# Patient Record
Sex: FEMALE | Race: ASIAN | NOT HISPANIC OR LATINO | URBAN - METROPOLITAN AREA
[De-identification: names, ages, dates, MRNs, and addresses within clinical notes are randomized per-mention and may not be internally consistent; named-entity substitution may affect disease eponyms.]

---

## 2020-11-16 ENCOUNTER — INPATIENT (INPATIENT)
Facility: HOSPITAL | Age: 50
LOS: 1 days | Discharge: HOME | DRG: 394 | End: 2020-11-18
Attending: STUDENT IN AN ORGANIZED HEALTH CARE EDUCATION/TRAINING PROGRAM | Admitting: STUDENT IN AN ORGANIZED HEALTH CARE EDUCATION/TRAINING PROGRAM
Payer: COMMERCIAL

## 2020-11-16 VITALS
SYSTOLIC BLOOD PRESSURE: 117 MMHG | WEIGHT: 145.06 LBS | RESPIRATION RATE: 22 BRPM | DIASTOLIC BLOOD PRESSURE: 77 MMHG | TEMPERATURE: 98 F | OXYGEN SATURATION: 100 % | HEART RATE: 100 BPM | HEIGHT: 62 IN

## 2020-11-16 LAB
ALBUMIN SERPL ELPH-MCNC: 4 G/DL — SIGNIFICANT CHANGE UP (ref 3.3–5)
ALP SERPL-CCNC: 62 U/L — SIGNIFICANT CHANGE UP (ref 40–120)
ALT FLD-CCNC: 13 U/L — SIGNIFICANT CHANGE UP (ref 10–45)
ANION GAP SERPL CALC-SCNC: 11 MMOL/L — SIGNIFICANT CHANGE UP (ref 5–17)
ANISOCYTOSIS BLD QL: SIGNIFICANT CHANGE UP
APPEARANCE UR: CLEAR — SIGNIFICANT CHANGE UP
APTT BLD: 30.6 SEC — SIGNIFICANT CHANGE UP (ref 27.5–35.5)
AST SERPL-CCNC: 17 U/L — SIGNIFICANT CHANGE UP (ref 10–40)
BACTERIA # UR AUTO: ABNORMAL
BASOPHILS # BLD AUTO: 0.09 K/UL — SIGNIFICANT CHANGE UP (ref 0–0.2)
BASOPHILS NFR BLD AUTO: 0.9 % — SIGNIFICANT CHANGE UP (ref 0–2)
BILIRUB SERPL-MCNC: 0.2 MG/DL — SIGNIFICANT CHANGE UP (ref 0.2–1.2)
BILIRUB UR-MCNC: NEGATIVE — SIGNIFICANT CHANGE UP
BLD GP AB SCN SERPL QL: POSITIVE — SIGNIFICANT CHANGE UP
BUN SERPL-MCNC: 12 MG/DL — SIGNIFICANT CHANGE UP (ref 7–23)
CALCIUM SERPL-MCNC: 8.1 MG/DL — LOW (ref 8.4–10.5)
CHLORIDE SERPL-SCNC: 107 MMOL/L — SIGNIFICANT CHANGE UP (ref 96–108)
CO2 SERPL-SCNC: 22 MMOL/L — SIGNIFICANT CHANGE UP (ref 22–31)
COLOR SPEC: SIGNIFICANT CHANGE UP
CREAT SERPL-MCNC: 0.71 MG/DL — SIGNIFICANT CHANGE UP (ref 0.5–1.3)
DIFF PNL FLD: NEGATIVE — SIGNIFICANT CHANGE UP
EOSINOPHIL # BLD AUTO: 0.09 K/UL — SIGNIFICANT CHANGE UP (ref 0–0.5)
EOSINOPHIL NFR BLD AUTO: 0.9 % — SIGNIFICANT CHANGE UP (ref 0–6)
EPI CELLS # UR: 4 /HPF — SIGNIFICANT CHANGE UP
GLUCOSE SERPL-MCNC: 113 MG/DL — HIGH (ref 70–99)
GLUCOSE UR QL: NEGATIVE — SIGNIFICANT CHANGE UP
HCG UR QL: NEGATIVE — SIGNIFICANT CHANGE UP
HCT VFR BLD CALC: 23.9 % — LOW (ref 34.5–45)
HGB BLD-MCNC: 6.5 G/DL — CRITICAL LOW (ref 11.5–15.5)
HYALINE CASTS # UR AUTO: 1 /LPF — SIGNIFICANT CHANGE UP (ref 0–2)
HYPOCHROMIA BLD QL: SLIGHT — SIGNIFICANT CHANGE UP
INR BLD: 0.97 RATIO — SIGNIFICANT CHANGE UP (ref 0.88–1.16)
KETONES UR-MCNC: NEGATIVE — SIGNIFICANT CHANGE UP
LEUKOCYTE ESTERASE UR-ACNC: ABNORMAL
LYMPHOCYTES # BLD AUTO: 1.99 K/UL — SIGNIFICANT CHANGE UP (ref 1–3.3)
LYMPHOCYTES # BLD AUTO: 19.1 % — SIGNIFICANT CHANGE UP (ref 13–44)
MACROCYTES BLD QL: SLIGHT — SIGNIFICANT CHANGE UP
MANUAL SMEAR VERIFICATION: SIGNIFICANT CHANGE UP
MCHC RBC-ENTMCNC: 25 PG — LOW (ref 27–34)
MCHC RBC-ENTMCNC: 27.2 GM/DL — LOW (ref 32–36)
MCV RBC AUTO: 91.9 FL — SIGNIFICANT CHANGE UP (ref 80–100)
MONOCYTES # BLD AUTO: 0.27 K/UL — SIGNIFICANT CHANGE UP (ref 0–0.9)
MONOCYTES NFR BLD AUTO: 2.6 % — SIGNIFICANT CHANGE UP (ref 2–14)
NEUTROPHILS # BLD AUTO: 7.96 K/UL — HIGH (ref 1.8–7.4)
NEUTROPHILS NFR BLD AUTO: 76.5 % — SIGNIFICANT CHANGE UP (ref 43–77)
NITRITE UR-MCNC: POSITIVE
NRBC # BLD: 1 /100 — HIGH (ref 0–0)
OB PNL STL: NEGATIVE — SIGNIFICANT CHANGE UP
PH UR: 5.5 — SIGNIFICANT CHANGE UP (ref 5–8)
PLAT MORPH BLD: NORMAL — SIGNIFICANT CHANGE UP
PLATELET # BLD AUTO: 448 K/UL — HIGH (ref 150–400)
POLYCHROMASIA BLD QL SMEAR: SLIGHT — SIGNIFICANT CHANGE UP
POTASSIUM SERPL-MCNC: 4 MMOL/L — SIGNIFICANT CHANGE UP (ref 3.5–5.3)
POTASSIUM SERPL-SCNC: 4 MMOL/L — SIGNIFICANT CHANGE UP (ref 3.5–5.3)
PROT SERPL-MCNC: 6.6 G/DL — SIGNIFICANT CHANGE UP (ref 6–8.3)
PROT UR-MCNC: NEGATIVE — SIGNIFICANT CHANGE UP
PROTHROM AB SERPL-ACNC: 11.6 SEC — SIGNIFICANT CHANGE UP (ref 10.6–13.6)
RBC # BLD: 2.6 M/UL — LOW (ref 3.8–5.2)
RBC # FLD: SIGNIFICANT CHANGE UP (ref 10.3–14.5)
RBC BLD AUTO: ABNORMAL
RBC CASTS # UR COMP ASSIST: 4 /HPF — SIGNIFICANT CHANGE UP (ref 0–4)
RH IG SCN BLD-IMP: POSITIVE — SIGNIFICANT CHANGE UP
SCHISTOCYTES BLD QL AUTO: SLIGHT — SIGNIFICANT CHANGE UP
SODIUM SERPL-SCNC: 140 MMOL/L — SIGNIFICANT CHANGE UP (ref 135–145)
SP GR SPEC: 1.02 — SIGNIFICANT CHANGE UP (ref 1.01–1.02)
UROBILINOGEN FLD QL: NEGATIVE — SIGNIFICANT CHANGE UP
WBC # BLD: 10.41 K/UL — SIGNIFICANT CHANGE UP (ref 3.8–10.5)
WBC # FLD AUTO: 10.41 K/UL — SIGNIFICANT CHANGE UP (ref 3.8–10.5)
WBC UR QL: 9 /HPF — HIGH (ref 0–5)

## 2020-11-16 PROCEDURE — 99285 EMERGENCY DEPT VISIT HI MDM: CPT

## 2020-11-16 NOTE — ED PROVIDER NOTE - PROGRESS NOTE DETAILS
Attending Masgiles:  Evaluated pt earlier. report of gi bleed. lighthead sob. Plan to fu CT read to r/o active bleeding. Pt had pos urine on sign out so ceftriaxone/urine cx added. CT resulted w/ no acute pathology. Discussed w/ hospitalist, admitted to [facility].

## 2020-11-16 NOTE — ED ADULT NURSE REASSESSMENT NOTE - NS ED NURSE REASSESS COMMENT FT1
PRBC transfusion started over 3 hours. Pt educated on risks and benefits of transfusion, and pt educated on s/s of transfusion reaction to be aware of. 2 peripheral IV in place. On bedside cardiac monitor. Consent in chart. 2 RN at bedside to confirm right pt and right blood product. Bed locked and lowered. Comfort and safety measures maintained.

## 2020-11-16 NOTE — ED ADULT NURSE NOTE - OBJECTIVE STATEMENT
50 year old female presents to ED via walk-in complaining of abnormal lab result, low hemoglobin. PMH thyroid cancer, had thyroid removed. Pt states she went to family doctor because she was having bloody bowel movements due to hemorriods, and was told to come to the emergency room. Upon assessment A&O x4, ambulatory with steady gait and well appearing. Denies dizziness, lightheadedness, however states she sometimes feel like she might faint. Pt also endorses palpitations, denies chest pain, and denies SOB. States she is having bright red blood stool, on and off since Thursday x5 days ago. Pt seen by Qdoc and Qdoc RN. Blood work and IV placed by Qdoc RN, and EKG completed in triage. On bedside cardiac monitor, VS as documented. Bed locked and lowered. Comfort and safety measures maintained.

## 2020-11-16 NOTE — ED PROVIDER NOTE - OBJECTIVE STATEMENT
49 y/o female hx of hemorrhoids (internal and external) presents for low outpatient hgb. states 11/7 days ago she had large volume rectal bleeding, had 4 similar episodes over the next 2 days.  states it has decreased since the 7th but that has had 3 more episodes within the last 7 days. the patient endorses SOB on exertion, cold sweats and palpitations. patient has never required transfusion.  pmd dr. jessica davis  gi: Dr. nidhi Alcaraz ([place])

## 2020-11-16 NOTE — ED PROVIDER NOTE - RAPID ASSESSMENT
50 y.o F presents with low H&H sent by PMD. Pt went to see PMD on Saturday and was found to have low H&H. PT has been endorsing SOB, palpitations, and rectal bleeding. Denies being on blood thinners.    Sony Barros (MD) note: The scribe (Cara Bradley)'s documentation has been prepared under my direction and personally reviewed by me.  Patient was seen as a tele QDOC patient, a thorough physical exam was not performed. The patient will be seen and further worked up in the main emergency department and their care will be completed by the main emergency department team. Receiving team will follow up on labs, analgesia, any clinical imaging, reassess and disposition as clinically indicated, all decisions regarding the progression of care will be made at their discretion.

## 2020-11-16 NOTE — ED ADULT NURSE REASSESSMENT NOTE - NS ED NURSE REASSESS COMMENT FT1
Lab called triage RN for critical lab value of h/h 6.5. MD Barros made aware. Per MD, pt should be brought back to be seen on a side. Mobile RN made aware. Pt pending placement. Remains a/ox4, VSS, sensory/motor function intact and in NAD at this time.

## 2020-11-16 NOTE — ED ADULT NURSE REASSESSMENT NOTE - NS ED NURSE REASSESS COMMENT FT1
Pt ambulated to bathroom with a steady gait. Pt ambulated to bathroom with a steady gait. Pt returned to bed safely. Pt denies dizziness and lightheadedness with ambulation at this time. Per blood bank, antibody testing to result in order to release blood. Bed locked and lowered. Comfort and safety measures maintained.

## 2020-11-16 NOTE — ED ADULT TRIAGE NOTE - CHIEF COMPLAINT QUOTE
sent for low h/h of 7.1 and rectal bleeding beginning Thursday, endorses SOB and heart palpitations. Patient denies  being on blood thinners.

## 2020-11-16 NOTE — ED PROVIDER NOTE - RAPID ASSESSMENT DATE/TIME
[facility]   Statement to Patients  April 1, 2014      In an effort to ensure the large volume of patient prescription refills is processed in the most efficient and expeditious manner, we are asking our patients to assist us by calling your Pharmacy for all prescription refills, this will include also your  Mail Order Pharmacy. The pharmacy will contact our office electronically to continue the refill process. Please do not wait until the last minute to call your pharmacy. We need at least 48 hours (2days) to fill prescriptions. We also encourage you to call your pharmacy before going to  your prescription to make sure it is ready. With regard to controlled substance prescription refill requests (narcotic refills) that need to be picked up at our office, we ask your cooperation by providing us with at least 72 hours (3days) notice that you will need a refill. We will not refill narcotic prescription refill requests after 4:00pm on any weekday, Monday through Thursday, or after 2:00pm on Fridays, or on the weekends. We encourage everyone to explore another way of getting your prescription refill request processed using [facility], our patient web portal through our electronic medical record system. [facility] is an efficient and effective way to communicate your medication request directly to the office and  downloadable as an jamie on your smart phone . [facility] also features a review functionality that allows you to view your medication list as well as leave messages for your physician. Are you ready to get connected? If so please review the attatched instructions or speak to any of our staff to get you set up right away! Thank you so much for your cooperation. Should you have any questions please contact our Practice Administrator.     The Physicians and Staff,  [facility]
16-Nov-2020 20:04

## 2020-11-17 DIAGNOSIS — Z98.890 OTHER SPECIFIED POSTPROCEDURAL STATES: Chronic | ICD-10-CM

## 2020-11-17 DIAGNOSIS — E03.9 HYPOTHYROIDISM, UNSPECIFIED: ICD-10-CM

## 2020-11-17 DIAGNOSIS — K76.9 LIVER DISEASE, UNSPECIFIED: ICD-10-CM

## 2020-11-17 DIAGNOSIS — D64.9 ANEMIA, UNSPECIFIED: ICD-10-CM

## 2020-11-17 DIAGNOSIS — N39.0 URINARY TRACT INFECTION, SITE NOT SPECIFIED: ICD-10-CM

## 2020-11-17 DIAGNOSIS — K64.9 UNSPECIFIED HEMORRHOIDS: ICD-10-CM

## 2020-11-17 LAB
HCT VFR BLD CALC: 30.1 % — LOW (ref 34.5–45)
HCT VFR BLD CALC: 30.4 % — LOW (ref 34.5–45)
HGB BLD-MCNC: 9 G/DL — LOW (ref 11.5–15.5)
HGB BLD-MCNC: 9 G/DL — LOW (ref 11.5–15.5)
MCHC RBC-ENTMCNC: 25.9 PG — LOW (ref 27–34)
MCHC RBC-ENTMCNC: 26.4 PG — LOW (ref 27–34)
MCHC RBC-ENTMCNC: 29.6 GM/DL — LOW (ref 32–36)
MCHC RBC-ENTMCNC: 29.9 GM/DL — LOW (ref 32–36)
MCV RBC AUTO: 87.4 FL — SIGNIFICANT CHANGE UP (ref 80–100)
MCV RBC AUTO: 88.3 FL — SIGNIFICANT CHANGE UP (ref 80–100)
NRBC # BLD: 0 /100 WBCS — SIGNIFICANT CHANGE UP (ref 0–0)
NRBC # BLD: 0 /100 WBCS — SIGNIFICANT CHANGE UP (ref 0–0)
PLATELET # BLD AUTO: 365 K/UL — SIGNIFICANT CHANGE UP (ref 150–400)
PLATELET # BLD AUTO: 375 K/UL — SIGNIFICANT CHANGE UP (ref 150–400)
RBC # BLD: 3.41 M/UL — LOW (ref 3.8–5.2)
RBC # BLD: 3.48 M/UL — LOW (ref 3.8–5.2)
RBC # FLD: 22.9 % — HIGH (ref 10.3–14.5)
RBC # FLD: 23.3 % — HIGH (ref 10.3–14.5)
SARS-COV-2 RNA SPEC QL NAA+PROBE: SIGNIFICANT CHANGE UP
WBC # BLD: 7.35 K/UL — SIGNIFICANT CHANGE UP (ref 3.8–10.5)
WBC # BLD: 9.31 K/UL — SIGNIFICANT CHANGE UP (ref 3.8–10.5)
WBC # FLD AUTO: 7.35 K/UL — SIGNIFICANT CHANGE UP (ref 3.8–10.5)
WBC # FLD AUTO: 9.31 K/UL — SIGNIFICANT CHANGE UP (ref 3.8–10.5)

## 2020-11-17 PROCEDURE — 12345: CPT | Mod: NC

## 2020-11-17 PROCEDURE — 74183 MRI ABD W/O CNTR FLWD CNTR: CPT | Mod: 26

## 2020-11-17 PROCEDURE — 74177 CT ABD & PELVIS W/CONTRAST: CPT | Mod: 26

## 2020-11-17 PROCEDURE — 99223 1ST HOSP IP/OBS HIGH 75: CPT

## 2020-11-17 RX ORDER — LEVOTHYROXINE SODIUM 125 MCG
1 TABLET ORAL
Qty: 0 | Refills: 0 | DISCHARGE

## 2020-11-17 RX ORDER — ACETAMINOPHEN 500 MG
650 TABLET ORAL EVERY 4 HOURS
Refills: 0 | Status: DISCONTINUED | OUTPATIENT
Start: 2020-11-17 | End: 2020-11-18

## 2020-11-17 RX ORDER — LEVOTHYROXINE SODIUM 125 MCG
100 TABLET ORAL DAILY
Refills: 0 | Status: DISCONTINUED | OUTPATIENT
Start: 2020-11-17 | End: 2020-11-18

## 2020-11-17 RX ORDER — SOD SULF/SODIUM/NAHCO3/KCL/PEG
4000 SOLUTION, RECONSTITUTED, ORAL ORAL ONCE
Refills: 0 | Status: COMPLETED | OUTPATIENT
Start: 2020-11-17 | End: 2020-11-17

## 2020-11-17 RX ORDER — CEFTRIAXONE 500 MG/1
1000 INJECTION, POWDER, FOR SOLUTION INTRAMUSCULAR; INTRAVENOUS ONCE
Refills: 0 | Status: COMPLETED | OUTPATIENT
Start: 2020-11-17 | End: 2020-11-17

## 2020-11-17 RX ORDER — SOD SULF/SODIUM/NAHCO3/KCL/PEG
4000 SOLUTION, RECONSTITUTED, ORAL ORAL ONCE
Refills: 0 | Status: DISCONTINUED | OUTPATIENT
Start: 2020-11-17 | End: 2020-11-18

## 2020-11-17 RX ORDER — PHENYLEPHRINE-SHARK LIVER OIL-MINERAL OIL-PETROLATUM RECTAL OINTMENT
1 OINTMENT (GRAM) RECTAL THREE TIMES A DAY
Refills: 0 | Status: DISCONTINUED | OUTPATIENT
Start: 2020-11-17 | End: 2020-11-18

## 2020-11-17 RX ADMIN — PHENYLEPHRINE-SHARK LIVER OIL-MINERAL OIL-PETROLATUM RECTAL OINTMENT 1 APPLICATION(S): at 22:39

## 2020-11-17 RX ADMIN — Medication 5 MILLIGRAM(S): at 21:28

## 2020-11-17 RX ADMIN — CEFTRIAXONE 100 MILLIGRAM(S): 500 INJECTION, POWDER, FOR SOLUTION INTRAMUSCULAR; INTRAVENOUS at 01:47

## 2020-11-17 RX ADMIN — Medication 100 MICROGRAM(S): at 05:51

## 2020-11-17 RX ADMIN — Medication 4000 MILLILITER(S): at 17:46

## 2020-11-17 NOTE — CONSULT NOTE ADULT - SUBJECTIVE AND OBJECTIVE BOX
Chief Complaint:  Patient is a 50y old  Female who presents with a chief complaint of anemia (2020 05:17)      HPI: 51 y/o female w/pmhx of thyroid cancer s/p thyroidectomy ,  hemorrhoids (internal and external) s/p banding , presents for worsened anemia. Patient reports several days of bright red blood per rectum. She reports bleeding subsides and then recurs after bowel movements. She was evaluated by her PMD on , and patient was notified on day of admission, that labs from the visit showed a hgb of 7.1 and patient was advised to come to the hospital. Patient reports some shortness of breath and fatigue with exertion , as well as intermittent palpitations ,she has no lightheadedness , no chest pain. She reports no abdominal pain , no nausea or vomiting. She has not yet had a routine colonoscopy. Patient does not use NSAIDs or asa.     Allergies:  No Known Allergies      Home Medications:    Hospital Medications:  acetaminophen   Tablet .. 650 milliGRAM(s) Oral every 4 hours PRN  levothyroxine 100 MICROGram(s) Oral daily      PMHX/PSHX:  Thyroid cancer    Hemorrhoids    S/P thyroidectomy        Family history:  FH: lung cancer        Denies family history of colon cancer/polyps, stomach cancer/polyps, pancreatic cancer/masses, liver cancer/disease, ovarian cancer and endometrial cancer.    Social History:     Tob: Denies  EtOH: Denies  Illicit Drugs: Denies    ROS:     General:  No wt loss, fevers, chills, night sweats, fatigue  Eyes:  Good vision, no reported pain  ENT:  No sore throat, pain, runny nose, dysphagia  CV:  No pain, palpitations, hypo/hypertension  Pulm:  No dyspnea, cough, tachypnea, wheezing  GI:  No pain, No nausea, No vomiting, No diarrhea, No constipation, No weight loss, No fever, No pruritis, No rectal bleeding, No tarry stools, No dysphagia,  :  No pain, bleeding, incontinence, nocturia  Muscle:  No pain, weakness  Neuro:  No weakness, tingling, memory problems  Psych:  No fatigue, insomnia, mood problems, depression  Endocrine:  No polyuria, polydipsia, cold/heat intolerance  Heme:  No petechiae, ecchymosis, easy bruisability  Skin:  No rash, tattoos, scars, edema    PHYSICAL EXAM:     GENERAL:  No acute distress  HEENT:  Normocephalic/atraumatic, no scleral icterus  CHEST:  Clear to auscultation bilaterally, no wheezes/rales/ronchi, no accessory muscle use  HEART:  Regular rate and rhythm, no murmurs/rubs/gallops  ABDOMEN:  Soft, non-tender, non-distended, normoactive bowel sounds,  no masses, no hepato-splenomegaly, no signs of chronic liver disease  EXTREMITIES: No cyanosis, clubbing, or edema  SKIN:  No rash/erythema/ecchymoses/petechiae/wounds/abscess/warm/dry  NEURO:  Alert and oriented x 3, no asterixis    Vital Signs:  Vital Signs Last 24 Hrs  T(C): 37 (2020 08:03), Max: 37 (2020 08:03)  T(F): 98.6 (2020 08:03), Max: 98.6 (2020 08:03)  HR: 85 (2020 08:03) (80 - 100)  BP: 107/73 (2020 08:03) (101/70 - 134/67)  BP(mean): --  RR: 19 (2020 08:03) (17 - 22)  SpO2: 99% (2020 08:03) (98% - 100%)  Daily Height in cm: 157.48 (2020 19:59)    Daily     LABS:                        9.0    9.31  )-----------( 375      ( 2020 06:43 )             30.1     Mean Cell Volume: 88.3 fl (-20 @ 06:43)        140  |  107  |  12  ----------------------------<  113<H>  4.0   |  22  |  0.71    Ca    8.1<L>      2020 20:32    TPro  6.6  /  Alb  4.0  /  TBili  0.2  /  DBili  x   /  AST  17  /  ALT  13  /  AlkPhos  62  11-16    LIVER FUNCTIONS - ( 2020 20:32 )  Alb: 4.0 g/dL / Pro: 6.6 g/dL / ALK PHOS: 62 U/L / ALT: 13 U/L / AST: 17 U/L / GGT: x           PT/INR - ( 2020 20:32 )   PT: 11.6 sec;   INR: 0.97 ratio         PTT - ( 2020 20:32 )  PTT:30.6 sec  Urinalysis Basic - ( 2020 22:38 )    Color: Light Yellow / Appearance: Clear / S.017 / pH: x  Gluc: x / Ketone: Negative  / Bili: Negative / Urobili: Negative   Blood: x / Protein: Negative / Nitrite: Positive   Leuk Esterase: Moderate / RBC: 4 /hpf / WBC 9 /HPF   Sq Epi: x / Non Sq Epi: 4 /hpf / Bacteria: Many                              9.0    9.31  )-----------( 375      ( 2020 06:43 )             30.1                         6.5    10.41 )-----------( 448      ( 2020 20:32 )             23.9       Imaging:           Chief Complaint:  Patient is a 50y old  Female who presents with a chief complaint of anemia (2020 05:17)      HPI: 50 F w/ Hx of hemorrhoids s/p banding (>5 years ago), thyroid cancer s/p thyroidectomy presenting from family medicine doctor for anemia w/ Hb 7.1 on outpatient labs and BRBPR since .     Endorses she     Patient reports several days of bright red blood per rectum. She reports bleeding subsides and then recurs after bowel movements. She was evaluated by her PMD on , and patient was notified on day of admission, that labs from the visit showed a hgb of 7.1 and patient was advised to come to the hospital. Patient reports some shortness of breath and fatigue with exertion , as well as intermittent palpitations ,she has no lightheadedness , no chest pain. She reports no abdominal pain , no nausea or vomiting. She has not yet had a routine colonoscopy. Patient does not use NSAIDs or asa.     Allergies:  No Known Allergies      Home Medications:    Hospital Medications:  acetaminophen   Tablet .. 650 milliGRAM(s) Oral every 4 hours PRN  levothyroxine 100 MICROGram(s) Oral daily      PMHX/PSHX:  Thyroid cancer    Hemorrhoids    S/P thyroidectomy        Family history:  FH: lung cancer        Denies family history of colon cancer/polyps, stomach cancer/polyps, pancreatic cancer/masses, liver cancer/disease, ovarian cancer and endometrial cancer.    Social History:     Tob: Denies  EtOH: Denies  Illicit Drugs: Denies    ROS:     General:  No wt loss, fevers, chills, night sweats, fatigue  Eyes:  Good vision, no reported pain  ENT:  No sore throat, pain, runny nose, dysphagia  CV:  No pain, palpitations, hypo/hypertension  Pulm:  No dyspnea, cough, tachypnea, wheezing  GI:  No pain, No nausea, No vomiting, No diarrhea, No constipation, No weight loss, No fever, No pruritis, No rectal bleeding, No tarry stools, No dysphagia,  :  No pain, bleeding, incontinence, nocturia  Muscle:  No pain, weakness  Neuro:  No weakness, tingling, memory problems  Psych:  No fatigue, insomnia, mood problems, depression  Endocrine:  No polyuria, polydipsia, cold/heat intolerance  Heme:  No petechiae, ecchymosis, easy bruisability  Skin:  No rash, tattoos, scars, edema    PHYSICAL EXAM:     GENERAL:  No acute distress  HEENT:  Normocephalic/atraumatic, no scleral icterus  CHEST:  Clear to auscultation bilaterally, no wheezes/rales/ronchi, no accessory muscle use  HEART:  Regular rate and rhythm, no murmurs/rubs/gallops  ABDOMEN:  Soft, non-tender, non-distended, normoactive bowel sounds,  no masses, no hepato-splenomegaly, no signs of chronic liver disease  EXTREMITIES: No cyanosis, clubbing, or edema  SKIN:  No rash/erythema/ecchymoses/petechiae/wounds/abscess/warm/dry  NEURO:  Alert and oriented x 3, no asterixis    Vital Signs:  Vital Signs Last 24 Hrs  T(C): 37 (2020 08:03), Max: 37 (2020 08:03)  T(F): 98.6 (2020 08:03), Max: 98.6 (2020 08:03)  HR: 85 (2020 08:03) (80 - 100)  BP: 107/73 (2020 08:03) (101/70 - 134/67)  BP(mean): --  RR: 19 (2020 08:03) (17 - 22)  SpO2: 99% (2020 08:03) (98% - 100%)  Daily Height in cm: 157.48 (2020 19:59)    Daily     LABS:                        9.0    9.31  )-----------( 375      ( 2020 06:43 )             30.1     Mean Cell Volume: 88.3 fl (- @ 06:43)        140  |  107  |  12  ----------------------------<  113<H>  4.0   |  22  |  0.71    Ca    8.1<L>      2020 20:32    TPro  6.6  /  Alb  4.0  /  TBili  0.2  /  DBili  x   /  AST  17  /  ALT  13  /  AlkPhos  62  11-16    LIVER FUNCTIONS - ( 2020 20:32 )  Alb: 4.0 g/dL / Pro: 6.6 g/dL / ALK PHOS: 62 U/L / ALT: 13 U/L / AST: 17 U/L / GGT: x           PT/INR - ( 2020 20:32 )   PT: 11.6 sec;   INR: 0.97 ratio         PTT - ( 2020 20:32 )  PTT:30.6 sec  Urinalysis Basic - ( 2020 22:38 )    Color: Light Yellow / Appearance: Clear / S.017 / pH: x  Gluc: x / Ketone: Negative  / Bili: Negative / Urobili: Negative   Blood: x / Protein: Negative / Nitrite: Positive   Leuk Esterase: Moderate / RBC: 4 /hpf / WBC 9 /HPF   Sq Epi: x / Non Sq Epi: 4 /hpf / Bacteria: Many                              9.0    9.31  )-----------( 375      ( 2020 06:43 )             30.1                         6.5    10.41 )-----------( 448      ( 2020 20:32 )             23.9       Imaging:           Chief Complaint:  Patient is a 50y old  Female who presents with a chief complaint of anemia (2020 05:17)      HPI: 50 F w/ Hx of hemorrhoids s/p banding (>5 years ago), thyroid cancer s/p thyroidectomy presenting from family medicine doctor for anemia w/ Hb 7.1 on outpatient labs and BRBPR since .     Endorses she had large amount of BRBPR w/ brown stool on , followed by similar BRBPR w/ brown stool every 2-3 days after that (last BM 2 days ago). Associated w/ sob, lightheadedness, feelings of weakness. Denies abd pain, rectal pain, n/v/d/f/c, melena, hematemesis, weight loss. Endorses she's had rectal bleeding from presumed hemorrhoids x 2-3 years, had been started on iron and was banded for her  hemorrhoids after seeing a specialist for them. Denies any previous Hx of colonoscopy.     Takes motrin 2-3 tabs when she has "hemorrhoid" pain. Not on AC.     Denies family Hx of CRC or hepatitis. Father w/ Hx of lung cancer.         Allergies:  No Known Allergies      Home Medications:    Hospital Medications:  acetaminophen   Tablet .. 650 milliGRAM(s) Oral every 4 hours PRN  levothyroxine 100 MICROGram(s) Oral daily      PMHX/PSHX:  Thyroid cancer    Hemorrhoids    S/P thyroidectomy        Family history:  FH: lung cancer        Denies family history of colon cancer/polyps, stomach cancer/polyps, pancreatic cancer/masses, liver cancer/disease, ovarian cancer and endometrial cancer.    Social History:     Tob: Denies  EtOH: Denies  Illicit Drugs: Denies    ROS:     General:  No wt loss, fevers, chills, night sweats, fatigue  Eyes:  Good vision, no reported pain  ENT:  No sore throat, pain, runny nose, dysphagia  CV:  No pain, palpitations, hypo/hypertension  Pulm:  No dyspnea, cough, tachypnea, wheezing  GI:  No pain, No nausea, No vomiting, No diarrhea, No constipation, No weight loss, No fever, No pruritis, No rectal bleeding, No tarry stools, No dysphagia,  :  No pain, bleeding, incontinence, nocturia  Muscle:  No pain, weakness  Neuro:  No weakness, tingling, memory problems  Psych:  No fatigue, insomnia, mood problems, depression  Endocrine:  No polyuria, polydipsia, cold/heat intolerance  Heme:  No petechiae, ecchymosis, easy bruisability  Skin:  No rash, tattoos, scars, edema    PHYSICAL EXAM:     GENERAL:  No acute distress, WD WN  HEENT:  Normocephalic/atraumatic, no scleral icterus  CHEST:  Clear to auscultation bilaterally, no wheezes/rales/ronchi, no accessory muscle use  HEART:  Regular rate and rhythm, no murmurs/rubs/gallops  ABDOMEN:  Soft, non-tender, non-distended, normoactive bowel sounds,  no masses, no hepato-splenomegaly  RECTAL: +large circumferential non thrombosed or bleeding hemorrhoids, brown stool in vault (no blood)  EXTREMITIES: No cyanosis, clubbing, or edema  SKIN:  No rash/erythema/ecchymoses/petechiae/wounds/abscess/warm/dry  NEURO:  Alert and oriented x 3, no asterixis    Vital Signs:  Vital Signs Last 24 Hrs  T(C): 37 (2020 08:03), Max: 37 (2020 08:03)  T(F): 98.6 (2020 08:03), Max: 98.6 (2020 08:03)  HR: 85 (2020 08:03) (80 - 100)  BP: 107/73 (2020 08:03) (101/70 - 134/67)  BP(mean): --  RR: 19 (2020 08:03) (17 - 22)  SpO2: 99% (2020 08:03) (98% - 100%)  Daily Height in cm: 157.48 (2020 19:59)    Daily     LABS:                        9.0    9.31  )-----------( 375      ( 2020 06:43 )             30.1     Mean Cell Volume: 88.3 fl (-20 @ 06:43)        140  |  107  |  12  ----------------------------<  113<H>  4.0   |  22  |  0.71    Ca    8.1<L>      2020 20:32    TPro  6.6  /  Alb  4.0  /  TBili  0.2  /  DBili  x   /  AST  17  /  ALT  13  /  AlkPhos  62  11-16    LIVER FUNCTIONS - ( 2020 20:32 )  Alb: 4.0 g/dL / Pro: 6.6 g/dL / ALK PHOS: 62 U/L / ALT: 13 U/L / AST: 17 U/L / GGT: x           PT/INR - ( 2020 20:32 )   PT: 11.6 sec;   INR: 0.97 ratio         PTT - ( 2020 20:32 )  PTT:30.6 sec  Urinalysis Basic - ( 2020 22:38 )    Color: Light Yellow / Appearance: Clear / S.017 / pH: x  Gluc: x / Ketone: Negative  / Bili: Negative / Urobili: Negative   Blood: x / Protein: Negative / Nitrite: Positive   Leuk Esterase: Moderate / RBC: 4 /hpf / WBC 9 /HPF   Sq Epi: x / Non Sq Epi: 4 /hpf / Bacteria: Many                              9.0    9.31  )-----------( 375      ( 2020 06:43 )             30.1                         6.5    10.41 )-----------( 448      ( 2020 20:32 )             23.9       Imaging:    < from: CT Abdomen and Pelvis w/ IV Cont (20 @ 00:20) >    EXAM:  CT ABDOMEN AND PELVIS IC                            PROCEDURE DATE:  2020            INTERPRETATION:  CLINICAL INFORMATION: Anemia and rectal bleeding. Evaluate for active GI bleed.    COMPARISON: None available.    PROCEDURE:  CT of the Abdomen and Pelvis was performed with and without intravenous contrast.  Precontrast, Arterial and Delayed phases were performed.  Intravenous contrast: 90 ml Omnipaque 350. 10 ml discarded.  Oral contrast: None.  Sagittal and coronal reformats were performed.    FINDINGS:    LOWER CHEST: Minimal dependent atelectasis.    LIVER: Hypodense lesions with peripheral contrast filling in the right lobe measuring up to 1.3 x 2.8 cm.  BILE DUCTS: Normal caliber.  GALLBLADDER: Cholelithiasis.  SPLEEN: Within normal limits.  PANCREAS: Within normal limits.    ADRENALS: Within normal limits.  KIDNEYS/URETERS: The kidneys enhance symmetrically. No hydronephrosis. Subcentimeter left renal hypodensity, too small to characterize.  BLADDER: Partially distended.  REPRODUCTIVE ORGANS: Myomatous uterus with slight deformity of the lower uterine segment, which may reflect postsurgical changes. A 1.2 x 1.3 cm right ovarian corpus luteum. Unremarkable adnexa.    BOWEL: No bowel obstruction. No contrast extravasation into the GI lumen. No significant bowel wall thickening or inflammatory changes. Colon diverticulosis. Unremarkable appendix.  PERITONEUM: No free air or drainable fluid collection.  VESSELS: Normal caliber of the abdominal aorta. Patent major abdominal/pelvic vessels.  RETROPERITONEUM/LYMPH NODES: No lymphadenopathy.  ABDOMINAL WALL: Small fat-containing umbilical hernia. Nonspecific stranding along the anterior pelvic wall, which may represent postsurgical changes.  BONES: Degenerative changes of the spine.    IMPRESSION:    No contrast extravasation into the GI lumen to suggest active bleeding.    Indeterminate hepatic lesions, which may represent hemangiomas in a patient without known malignancy. This can be further assessed on a nonemergent, contrast-enhanced MRI.    Additional findings as described.      < end of copied text >

## 2020-11-17 NOTE — PROGRESS NOTE ADULT - SUBJECTIVE AND OBJECTIVE BOX
[facility] Division of Hospital Medicine  Rose Davison   Pager (M-F, 8V-1W): [phone]  Other Times:  [phone]    Patient is a 50y old  Female who presents with a chief complaint of anemia (2020 08:15)      SUBJECTIVE / OVERNIGHT EVENTS: Admitted overnight. Patient feels better than on admission, no shortness of breath or dizziness. No bright red blood per rectum while in hospital. No abdominal pain.  ADDITIONAL REVIEW OF SYSTEMS: negative    MEDICATIONS  (STANDING):  levothyroxine 100 MICROGram(s) Oral daily  polyethylene glycol/electrolyte Solution. 4000 milliLiter(s) Oral once    MEDICATIONS  (PRN):  acetaminophen   Tablet .. 650 milliGRAM(s) Oral every 4 hours PRN Mild Pain (1 - 3)    PHYSICAL EXAM:  Vital Signs Last 24 Hrs  T(C): 37 (2020 13:06), Max: 37 (2020 08:03)  T(F): 98.6 (2020 13:06), Max: 98.6 (2020 08:03)  HR: 87 (2020 13:06) (80 - 100)  BP: 110/74 (2020 13:06) (101/70 - 134/67)  BP(mean): --  RR: 17 (2020 13:06) (17 - 22)  SpO2: 100% (2020 13:06) (98% - 100%)    CONSTITUTIONAL: NAD, well-developed, well-groomed  EYES: PERRLA; conjunctiva and sclera clear  ENMT: Moist oral mucosa, no pharyngeal injection or exudates  NECK: Supple, no palpable masses; no thyromegaly  RESPIRATORY: Normal respiratory effort; lungs are clear to auscultation bilaterally  CARDIOVASCULAR: Regular rate and rhythm, normal S1 and S2, no murmur/rub/gallop; No lower extremity edema; Peripheral pulses are 2+ bilaterally  ABDOMEN: Nontender to palpation, normoactive bowel sounds, no rebound/guarding; No hepatosplenomegaly  MUSCULOSKELETAL: no clubbing or cyanosis of digits; no joint swelling or tenderness to palpation  PSYCH: A+O to person, place, and time; affect appropriate  NEUROLOGY: CN 2-12 are intact and symmetric; no gross sensory deficits   SKIN: No rashes; no palpable lesions    LABS:                        9.0    9.31  )-----------( 375      ( 2020 06:43 )             30.1     11-16    140  |  107  |  12  ----------------------------<  113<H>  4.0   |  22  |  0.71    Ca    8.1<L>      2020 20:32    TPro  6.6  /  Alb  4.0  /  TBili  0.2  /  DBili  x   /  AST  17  /  ALT  13  /  AlkPhos  62  11-16    PT/INR - ( 2020 20:32 )   PT: 11.6 sec;   INR: 0.97 ratio         PTT - ( 2020 20:32 )  PTT:30.6 sec      Urinalysis Basic - ( 2020 22:38 )    Color: Light Yellow / Appearance: Clear / S.017 / pH: x  Gluc: x / Ketone: Negative  / Bili: Negative / Urobili: Negative   Blood: x / Protein: Negative / Nitrite: Positive   Leuk Esterase: Moderate / RBC: 4 /hpf / WBC 9 /HPF   Sq Epi: x / Non Sq Epi: 4 /hpf / Bacteria: Many

## 2020-11-17 NOTE — H&P ADULT - ASSESSMENT
50F w/pmhx of thyroid cancer s/p thyroidectomy ,  hemorrhoids  s/p banding , p/w anemia in setting of rectal bleeding

## 2020-11-17 NOTE — H&P ADULT - PROBLEM SELECTOR PLAN 1
normocytic anemia , hgb 6.5 receiving transfused PRBCS , suspect internal hemorrhoidal bleeding , no intraluminal bleeding on CT    - check cbc post transfusion   - clear liquid diet   - GI consult emailed- day team to f/u recommendations

## 2020-11-17 NOTE — PROGRESS NOTE ADULT - PROBLEM SELECTOR PLAN 2
may require banding vs surgical intervention if bleeding persist   - GI consult  - has colonoscopy appointment with her outpatient GI scheduled for this weekend

## 2020-11-17 NOTE — H&P ADULT - NSHPREVIEWOFSYSTEMS_GEN_ALL_CORE
CONSTITUTIONAL: + weakness, no fevers or chills  EYES/ENT: No visual changes;  No dysphagia  NECK: No pain or stiffness  RESPIRATORY: No cough, wheezing, hemoptysis; + shortness of breath  CARDIOVASCULAR: No chest pain + palpitations; No lower extremity edema  EXTREMITIES: no le edema, cyanosis, clubbing  GASTROINTESTINAL: No abdominal or epigastric pain. No nausea, vomiting, or hematemesis; No diarrhea or constipation. No melena + hematochezia.  BACK: No back pain  GENITOURINARY: No dysuria, frequency or hematuria  NEUROLOGICAL: No numbness or weakness  MSK: no joint swelling or pain  SKIN: No itching, burning, rashes, or lesions   PSYCH: no agitation  All other review of systems is negative unless indicated above.

## 2020-11-17 NOTE — PROGRESS NOTE ADULT - PROBLEM SELECTOR PLAN 5
findings c/w hemangiomas, given h/o thyroid ca, recommended to obtain MRI to further clarify lesions  - MRI abdomen

## 2020-11-17 NOTE — CONSULT NOTE ADULT - ASSESSMENT
IMPRESSION:   #BRBPR: Ddx     RECOMMENDATIONS:   - Clear liquid diet  - Trend CBC, Transfuse Hb < 7  - Active T&S  - Will discuss endoscopic intervention         Thank you for involving us in the care of this patient, please reach out if any further questions.     Kwame Yip MD  Gastroenterology Fellow, PGY4    Available on Microsoft Teams  [phone] ([facility])  [phone] ([facility])  Please contact on call fellow weekdays after 5pm-7am and weekends: [phone]     IMPRESSION:   #BRBPR: Ddx   #Hepatic lesions: seen on CT a/p, c/f hemangiomas (measuring up to 1.2 x 1.3). Will need MRI for further characterization.     RECOMMENDATIONS:   - Plan for colonoscopy tomorrow  - Prep: 4L golytely + dulcolax 5mg  - Clear liquid diet today  - NPO MN tonight  - Trend CBC, Transfuse Hb < 7  - Active T&S  - Obtain MRI w/ IV contrast (triple phase protocol) to evaluate hepatic lesions  - Please send hep serologies (Hep B surface Ag + Ab, Hep C surface Ab w/ reflex to PCR        Thank you for involving us in the care of this patient, please reach out if any further questions.     Kwame Yip MD  Gastroenterology Fellow, PGY4    Available on Microsoft Teams  [phone] ([facility])  [phone] ([facility])  Please contact on call fellow weekdays after 5pm-7am and weekends: [phone]     IMPRESSION:   #BRBPR: Hb drop w/ BRBPR x 2 weeks in the setting of intermittent rectal bleeding x 2-3 years. Ddx hemorrhoids vs malignancy vs bleeding polyps vs diverticulosis vs angioectasias. Will need colonoscopy for further evaluation.  #Hepatic lesions: seen on CT a/p, c/f hemangiomas (measuring up to 1.2 x 1.3). Will need MRI for further characterization.     RECOMMENDATIONS:   - Plan for colonoscopy tomorrow  - Prep: 4L golytely + dulcolax 5mg  - Clear liquid diet today  - NPO MN tonight  - Trend CBC, Transfuse Hb < 7  - Active T&S  - Obtain MRI w/ IV contrast (triple phase protocol) to evaluate hepatic lesions  - Please send hep serologies (Hep B surface Ag + Ab, Hep C surface Ab w/ reflex to PCR        Thank you for involving us in the care of this patient, please reach out if any further questions.     Kwame Yip MD  Gastroenterology Fellow, PGY4    Available on Microsoft Teams  [phone] ([facility])  [phone] ([facility])  Please contact on call fellow weekdays after 5pm-7am and weekends: [phone]

## 2020-11-17 NOTE — H&P ADULT - NSHPLABSRESULTS_GEN_ALL_CORE
Labs personally reviewed:                          6.5    10.41 )-----------( 448      ( 2020 20:32 )             23.9     11-16    140  |  107  |  12  ----------------------------<  113<H>  4.0   |  22  |  0.71    Ca    8.1<L>      2020 20:32    TPro  6.6  /  Alb  4.0  /  TBili  0.2  /  DBili  x   /  AST  17  /  ALT  13  /  AlkPhos  62  11-16        LIVER FUNCTIONS - ( 2020 20:32 )  Alb: 4.0 g/dL / Pro: 6.6 g/dL / ALK PHOS: 62 U/L / ALT: 13 U/L / AST: 17 U/L / GGT: x           PT/INR - ( 2020 20:32 )   PT: 11.6 sec;   INR: 0.97 ratio         PTT - ( 2020 20:32 )  PTT:30.6 sec  Urinalysis Basic - ( 2020 22:38 )    Color: Light Yellow / Appearance: Clear / S.017 / pH: x  Gluc: x / Ketone: Negative  / Bili: Negative / Urobili: Negative   Blood: x / Protein: Negative / Nitrite: Positive   Leuk Esterase: Moderate / RBC: 4 /hpf / WBC 9 /HPF   Sq Epi: x / Non Sq Epi: 4 /hpf / Bacteria: Many      CAPILLARY BLOOD GLUCOSE          Imaging:  CT abdomen: No contrast extravasation into the GI lumen to suggest active bleeding.    Indeterminate hepatic lesions, which may represent hemangiomas in a patient without known malignancy. This can be further assessed on a nonemergent, contrast-enhanced MRI.    Additional findings as described.      EKG personally reviewed:

## 2020-11-17 NOTE — H&P ADULT - NSHPSOCIALHISTORY_GEN_ALL_CORE
Social History:    Marital Status:  (  x )    (   ) Single    (   )    (  )   Occupation:   Lives with: (  ) alone  ( x ) children   (  ) spouse   (  ) parents  (  ) other    Substance Use (street drugs): ( x ) never used  (  ) other:  Tobacco Usage:  (  x ) never smoked   (   ) former smoker   (   ) current smoker  (     ) pack year  (        ) last cigarette date  Alcohol Usage: no etoh use

## 2020-11-17 NOTE — ED ADULT NURSE REASSESSMENT NOTE - NS ED NURSE REASSESS COMMENT FT1
First unit of blood completed. Release request sent to blood bank for second unit of blood. Pt resting comfortably in bed at this time. Pt pending CT results, to be admitted. Bed locked and lowered. Comfort and safety measures maintained.

## 2020-11-17 NOTE — H&P ADULT - NSHPPHYSICALEXAM_GEN_ALL_CORE
Vital Signs Last 24 Hrs  T(C): 36.6 (17 Nov 2020 05:15), Max: 36.9 (16 Nov 2020 21:25)  T(F): 97.9 (17 Nov 2020 05:15), Max: 98.5 (16 Nov 2020 23:18)  HR: 80 (17 Nov 2020 05:15) (80 - 100)  BP: 105/69 (17 Nov 2020 05:15) (105/61 - 134/67)  BP(mean): --  RR: 18 (17 Nov 2020 05:15) (17 - 22)  SpO2: 99% (17 Nov 2020 05:15) (98% - 100%)    GENERAL: No acute distress, well-developed  HEAD:  Atraumatic, Normocephalic  ENT: EOMI, PERRLA, conjunctiva and sclera clear,  moist mucosa no pharyngeal erythema or exudates   NECK: supple , no JVD   CHEST/LUNG: Clear to auscultation bilaterally; No wheeze, equal breath sounds bilaterally   BACK: No spinal tenderness,  No CVA tenderness   HEART: Regular rate and rhythm; No murmurs, rubs, or gallops  ABDOMEN: Soft, Nontender, Nondistended; Bowel sounds present  EXTREMITIES:  No clubbing, cyanosis, or edema  MSK: No joint swelling or effusions, ROM intact   PSYCH: Normal behavior/affect  NEUROLOGY: AAOx3, non-focal, cranial nerves intact  SKIN: Normal color, No rashes or lesions

## 2020-11-17 NOTE — H&P ADULT - HISTORY OF PRESENT ILLNESS
Patient is a 51 y/o female w/pmhx of thyroid cancer s/p thyroidectomy ,  hemorrhoids (internal and external) s/p banding , presents for worsened anemia. Patient reports several days of bright red blood per rectum. She reports bleeding subsides and then recurs after bowel movements. She was evaluated by her PMD on 11/7, and patient was notified on day of admission, that labs from the visit showed a hgb of 7.1 and patient was advised to come to the hospital. Patient reports some shortness of breath and fatigue with exertion , as well as intermittent palpitations ,she has no lightheadedness , no chest pain. She reports no abdominal pain , no nausea or vomiting. She has not yet had a routine colonoscopy. Patient does not use NSAIDs or asa.

## 2020-11-17 NOTE — H&P ADULT - PROBLEM SELECTOR PLAN 5
findings c/w hemangiomas , given h/o thyroid ca , recommended to obtain MRI to further clarify lesions,   - MRI abdomen

## 2020-11-17 NOTE — PROGRESS NOTE ADULT - PROBLEM SELECTOR PLAN 1
normocytic anemia, hgb 6.5 transfused 3 PRBCS, suspect internal hemorrhoidal bleeding, no intraluminal bleeding on CT  - cbc post transfusion = 9  - cbc q12  - clear liquid diet   - GI consult recs appreciated

## 2020-11-18 VITALS
TEMPERATURE: 98 F | OXYGEN SATURATION: 100 % | DIASTOLIC BLOOD PRESSURE: 68 MMHG | SYSTOLIC BLOOD PRESSURE: 106 MMHG | RESPIRATION RATE: 16 BRPM | HEART RATE: 78 BPM

## 2020-11-18 LAB
ANION GAP SERPL CALC-SCNC: 11 MMOL/L — SIGNIFICANT CHANGE UP (ref 5–17)
BASOPHILS # BLD AUTO: 0.04 K/UL — SIGNIFICANT CHANGE UP (ref 0–0.2)
BASOPHILS NFR BLD AUTO: 0.5 % — SIGNIFICANT CHANGE UP (ref 0–2)
BUN SERPL-MCNC: 8 MG/DL — SIGNIFICANT CHANGE UP (ref 7–23)
CALCIUM SERPL-MCNC: 8 MG/DL — LOW (ref 8.4–10.5)
CHLORIDE SERPL-SCNC: 105 MMOL/L — SIGNIFICANT CHANGE UP (ref 96–108)
CO2 SERPL-SCNC: 25 MMOL/L — SIGNIFICANT CHANGE UP (ref 22–31)
CREAT SERPL-MCNC: 0.61 MG/DL — SIGNIFICANT CHANGE UP (ref 0.5–1.3)
EOSINOPHIL # BLD AUTO: 0.1 K/UL — SIGNIFICANT CHANGE UP (ref 0–0.5)
EOSINOPHIL NFR BLD AUTO: 1.3 % — SIGNIFICANT CHANGE UP (ref 0–6)
GLUCOSE SERPL-MCNC: 79 MG/DL — SIGNIFICANT CHANGE UP (ref 70–99)
HCT VFR BLD CALC: 28.8 % — LOW (ref 34.5–45)
HGB BLD-MCNC: 8.7 G/DL — LOW (ref 11.5–15.5)
IMM GRANULOCYTES NFR BLD AUTO: 0.6 % — SIGNIFICANT CHANGE UP (ref 0–1.5)
LYMPHOCYTES # BLD AUTO: 1.5 K/UL — SIGNIFICANT CHANGE UP (ref 1–3.3)
LYMPHOCYTES # BLD AUTO: 19.1 % — SIGNIFICANT CHANGE UP (ref 13–44)
MAGNESIUM SERPL-MCNC: 2.4 MG/DL — SIGNIFICANT CHANGE UP (ref 1.6–2.6)
MCHC RBC-ENTMCNC: 26.3 PG — LOW (ref 27–34)
MCHC RBC-ENTMCNC: 30.2 GM/DL — LOW (ref 32–36)
MCV RBC AUTO: 87 FL — SIGNIFICANT CHANGE UP (ref 80–100)
MONOCYTES # BLD AUTO: 0.65 K/UL — SIGNIFICANT CHANGE UP (ref 0–0.9)
MONOCYTES NFR BLD AUTO: 8.3 % — SIGNIFICANT CHANGE UP (ref 2–14)
NEUTROPHILS # BLD AUTO: 5.5 K/UL — SIGNIFICANT CHANGE UP (ref 1.8–7.4)
NEUTROPHILS NFR BLD AUTO: 70.2 % — SIGNIFICANT CHANGE UP (ref 43–77)
NRBC # BLD: 0 /100 WBCS — SIGNIFICANT CHANGE UP (ref 0–0)
PHOSPHATE SERPL-MCNC: 3.7 MG/DL — SIGNIFICANT CHANGE UP (ref 2.5–4.5)
PLATELET # BLD AUTO: 380 K/UL — SIGNIFICANT CHANGE UP (ref 150–400)
POTASSIUM SERPL-MCNC: 3.4 MMOL/L — LOW (ref 3.5–5.3)
POTASSIUM SERPL-SCNC: 3.4 MMOL/L — LOW (ref 3.5–5.3)
RBC # BLD: 3.31 M/UL — LOW (ref 3.8–5.2)
RBC # FLD: 22.9 % — HIGH (ref 10.3–14.5)
SARS-COV-2 IGG SERPL QL IA: NEGATIVE — SIGNIFICANT CHANGE UP
SARS-COV-2 IGM SERPL IA-ACNC: 0.19 INDEX — SIGNIFICANT CHANGE UP
SODIUM SERPL-SCNC: 141 MMOL/L — SIGNIFICANT CHANGE UP (ref 135–145)
WBC # BLD: 7.84 K/UL — SIGNIFICANT CHANGE UP (ref 3.8–10.5)
WBC # FLD AUTO: 7.84 K/UL — SIGNIFICANT CHANGE UP (ref 3.8–10.5)

## 2020-11-18 PROCEDURE — 43235 EGD DIAGNOSTIC BRUSH WASH: CPT

## 2020-11-18 PROCEDURE — A9585: CPT

## 2020-11-18 PROCEDURE — 86769 SARS-COV-2 COVID-19 ANTIBODY: CPT

## 2020-11-18 PROCEDURE — 99285 EMERGENCY DEPT VISIT HI MDM: CPT | Mod: 25

## 2020-11-18 PROCEDURE — 86900 BLOOD TYPING SEROLOGIC ABO: CPT

## 2020-11-18 PROCEDURE — 85730 THROMBOPLASTIN TIME PARTIAL: CPT

## 2020-11-18 PROCEDURE — 93005 ELECTROCARDIOGRAM TRACING: CPT

## 2020-11-18 PROCEDURE — 84100 ASSAY OF PHOSPHORUS: CPT

## 2020-11-18 PROCEDURE — 80053 COMPREHEN METABOLIC PANEL: CPT

## 2020-11-18 PROCEDURE — 74183 MRI ABD W/O CNTR FLWD CNTR: CPT

## 2020-11-18 PROCEDURE — 85027 COMPLETE CBC AUTOMATED: CPT

## 2020-11-18 PROCEDURE — 96374 THER/PROPH/DIAG INJ IV PUSH: CPT | Mod: XU

## 2020-11-18 PROCEDURE — 83735 ASSAY OF MAGNESIUM: CPT

## 2020-11-18 PROCEDURE — 86905 BLOOD TYPING RBC ANTIGENS: CPT

## 2020-11-18 PROCEDURE — 80048 BASIC METABOLIC PNL TOTAL CA: CPT

## 2020-11-18 PROCEDURE — 99239 HOSP IP/OBS DSCHRG MGMT >30: CPT

## 2020-11-18 PROCEDURE — 82272 OCCULT BLD FECES 1-3 TESTS: CPT

## 2020-11-18 PROCEDURE — 81001 URINALYSIS AUTO W/SCOPE: CPT

## 2020-11-18 PROCEDURE — 86922 COMPATIBILITY TEST ANTIGLOB: CPT

## 2020-11-18 PROCEDURE — 36430 TRANSFUSION BLD/BLD COMPNT: CPT

## 2020-11-18 PROCEDURE — 87186 SC STD MICRODIL/AGAR DIL: CPT

## 2020-11-18 PROCEDURE — P9016: CPT

## 2020-11-18 PROCEDURE — 85610 PROTHROMBIN TIME: CPT

## 2020-11-18 PROCEDURE — 81025 URINE PREGNANCY TEST: CPT

## 2020-11-18 PROCEDURE — 87086 URINE CULTURE/COLONY COUNT: CPT

## 2020-11-18 PROCEDURE — 86902 BLOOD TYPE ANTIGEN DONOR EA: CPT

## 2020-11-18 PROCEDURE — 85025 COMPLETE CBC W/AUTO DIFF WBC: CPT

## 2020-11-18 PROCEDURE — 74177 CT ABD & PELVIS W/CONTRAST: CPT

## 2020-11-18 PROCEDURE — 45378 DIAGNOSTIC COLONOSCOPY: CPT

## 2020-11-18 PROCEDURE — 86901 BLOOD TYPING SEROLOGIC RH(D): CPT

## 2020-11-18 PROCEDURE — U0003: CPT

## 2020-11-18 PROCEDURE — 86850 RBC ANTIBODY SCREEN: CPT

## 2020-11-18 PROCEDURE — 86880 COOMBS TEST DIRECT: CPT

## 2020-11-18 PROCEDURE — 86870 RBC ANTIBODY IDENTIFICATION: CPT

## 2020-11-18 RX ORDER — SOD SULF/SODIUM/NAHCO3/KCL/PEG
4000 SOLUTION, RECONSTITUTED, ORAL ORAL
Qty: 0 | Refills: 0 | DISCHARGE
Start: 2020-11-18

## 2020-11-18 RX ORDER — PHENYLEPHRINE-SHARK LIVER OIL-MINERAL OIL-PETROLATUM RECTAL OINTMENT
1 OINTMENT (GRAM) RECTAL
Qty: 1 | Refills: 0
Start: 2020-11-18 | End: 2020-11-24

## 2020-11-18 RX ADMIN — PHENYLEPHRINE-SHARK LIVER OIL-MINERAL OIL-PETROLATUM RECTAL OINTMENT 1 APPLICATION(S): at 05:15

## 2020-11-18 RX ADMIN — Medication 100 MICROGRAM(S): at 05:14

## 2020-11-18 NOTE — DISCHARGE NOTE PROVIDER - NSDCMRMEDTOKEN_GEN_ALL_CORE_FT
bisacodyl 5 mg oral delayed release tablet: 1 tab(s) orally once a day (at bedtime)  levothyroxine 100 mcg (0.1 mg) oral tablet: 1 tab(s) orally once a day  polyethylene glycol 3350 with electrolytes oral powder for reconstitution: 4000 milliliter(s) orally once   bisacodyl 5 mg oral delayed release tablet: 1 tab(s) orally once a day (at bedtime)  levothyroxine 100 mcg (0.1 mg) oral tablet: 1 tab(s) orally once a day  polyethylene glycol 3350 with electrolytes oral powder for reconstitution: 4000 milliliter(s) orally once  Preparation H Cooling 0.25% rectal gel: 1 application rectal 4 times a day   bisacodyl 5 mg oral delayed release tablet: 1 tab(s) orally once a day (at bedtime)  levothyroxine 100 mcg (0.1 mg) oral tablet: 1 tab(s) orally once a day  Preparation H Cooling 0.25% rectal gel: 1 application rectal 4 times a day

## 2020-11-18 NOTE — DISCHARGE NOTE NURSING/CASE MANAGEMENT/SOCIAL WORK - PATIENT PORTAL LINK FT
You can access the FollowMyHealth Patient Portal offered by [facility] by registering at the following website: [email or web address]. By joining [facility]’s FollowMyHealth portal, you will also be able to view your health information using other applications (apps) compatible with our system.

## 2020-11-18 NOTE — DISCHARGE NOTE PROVIDER - HOSPITAL COURSE
50F PMH thyroid cancer s/p thyroidectomy, hemorrhoids  s/p banding p/w anemia in setting of rectal bleeding. transfused 2 units PRBC, seen by GI had endo/colonoscopy,colonoscopy showed Non-bleeding external and internal hemorrhoids     50F PMH thyroid cancer s/p thyroidectomy, hemorrhoids p/w anemia in setting of rectal bleeding. Hemoglobin was 6.5 on admission, transfused 2 units PRBC, repeat CBC was 9, then 9, then 8.7. Seen by GI had EGD/colonoscopy, colonoscopy showed Non-bleeding external and internal hemorrhoids. Stable for discharge home with GI followup.

## 2020-11-18 NOTE — PRE PROCEDURE NOTE - PRE PROCEDURE EVALUATION
Attending Physician:   Johanna                         Procedure: Colonoscopy     Indication for Procedure: BRBPR  ________________________________________________________  PAST MEDICAL & SURGICAL HISTORY:  Thyroid cancer    Hemorrhoids    S/P thyroidectomy      ALLERGIES:  No Known Allergies    HOME MEDICATIONS:  levothyroxine 100 mcg (0.1 mg) oral tablet: 1 tab(s) orally once a day    AICD/PPM: [ ] yes   [ X ] no    PERTINENT LAB DATA:                        8.7    7.84  )-----------( 380      ( 18 Nov 2020 06:50 )             28.8     11-18    141  |  105  |  8   ----------------------------<  79  3.4<L>   |  25  |  0.61    Ca    8.0<L>      18 Nov 2020 06:50  Phos  3.7     11-18  Mg     2.4     11-18    TPro  6.6  /  Alb  4.0  /  TBili  0.2  /  DBili  x   /  AST  17  /  ALT  13  /  AlkPhos  62  11-16    PT/INR - ( 16 Nov 2020 20:32 )   PT: 11.6 sec;   INR: 0.97 ratio         PTT - ( 16 Nov 2020 20:32 )  PTT:30.6 sec            PHYSICAL EXAMINATION:    T(C): 36.6  HR: 83  BP: 102/67  RR: 18  SpO2: 98%    Constitutional: NAD    Neck:  No JVD  Respiratory: CTAB/L  Cardiovascular: S1 and S2  Gastrointestinal: BS+, soft, NT/ND  Extremities: No peripheral edema  Neurological: A/O x 3, no focal deficits        COMMENTS:    The patient is a suitable candidate for the planned procedure unless box checked [ ]  No, explain:

## 2020-11-18 NOTE — DISCHARGE NOTE PROVIDER - CARE PROVIDER_API CALL
Yung Spencer  GASTROENTEROLOGY  [address] [place] [id number]  Phone: [phone]  Fax: [phone]  Follow Up Time:     Dr Chris  Phone: [phone]  Fax: (   )    -  Follow Up Time:

## 2020-11-18 NOTE — DISCHARGE NOTE PROVIDER - PROVIDER TOKENS
PROVIDER:[TOKEN:[[id number]]],FREE:[LAST:[Chris],FIRST:[Dr],PHONE:[phone]],FAX:[(   )    -]]

## 2020-11-18 NOTE — CHART NOTE - NSCHARTNOTEFT_GEN_A_CORE
Patient seen and examined after morning EGD/colonoscopy. No active bleeding was seen. Patient's hgb has remained stable over the last 24h. Counseled to take sitz baths at home, use hemorrhoidal ointment, take miralax to avoid constipation, and follow up with her GI doctor (has an appointment tomorrow afternoon). Will provide patient with phone number for colorectal surgeon to follow with for her hemorrhoids. Patient stable for discharge home and can resume regular diet. 35 minuets spent coordinating discharge.     Will fax over results of EGD/colonoscopy to outpatient GI doctor's office.     Plan discussed with OTONIEL Garvin.    Rose Davison DO  Pager #: [phone] Patient seen and examined after morning EGD/colonoscopy. No active bleeding was seen but there were internal and external hemorrhoids. Patient's hgb has remained stable over the last 24h. Counseled to take sitz baths at home, use hemorrhoidal ointment, take miralax to avoid constipation, and follow up with her GI doctor (has an appointment tomorrow afternoon). Will provide patient with phone number for colorectal surgeon to follow with for her hemorrhoids. Patient stable for discharge home and can resume regular diet. 35 minuets spent coordinating discharge.     Will fax over results of EGD/colonoscopy to outpatient GI doctor's office.     Plan discussed with OTONIEL Garvin.    Rose Davison DO  Pager #: [phone]

## 2020-11-18 NOTE — DISCHARGE NOTE PROVIDER - NSDCCPCAREPLAN_GEN_ALL_CORE_FT
PRINCIPAL DISCHARGE DIAGNOSIS  Diagnosis: Anemia  Assessment and Plan of Treatment: Follow up with your primary care provider  Follow up with your GI doctor

## 2020-12-16 PROBLEM — Z00.00 ENCOUNTER FOR PREVENTIVE HEALTH EXAMINATION: Status: ACTIVE | Noted: 2020-12-16

## 2023-01-11 NOTE — ED PROVIDER NOTE - CLINICAL SUMMARY MEDICAL DECISION MAKING FREE TEXT BOX
NUTRITION BRIEF NOTE:      - Patient meeting nutrition needs orally, no longer needs NDT from a nutrition standpoint.   - Hyperglycemic since TF discontinuation 1/9, needs insulin regimen adjusted    Recent Labs   Lab 01/11/23  0801 01/10/23  2146 01/10/23  1717 01/10/23  1140 01/10/23  1024 01/10/23  0750   * 231* 228* 306* 273* 286*       Interventions:    Discussed weight maintenance with patient, goal >/= 90#. She is in agreement.    Discussed with provider today    Collaboration and Referral of care: Discussed patient care during IDT care progression meeting this AM    Please page/consult as needed.    Simin Nguyen RDN, LD  Clinical Dietitian   Dr. Jane Foster: 50 year old female with history of anemia and hemorrhoids presented to ED with 10 days of bright red blood per rectum during defecation; sent to ED for low H/H.  No abnormal vaginal bleeding. No abdominal pain, nausea, vomiting, hematemesis. On exam: Abdomen soft, non-tender, non-distended. Impression: Bleeding likely  secondary to hemorrhoid. Unlikely upper GI bleed. Plan: Labs, CT a/p. pRBC transfusion PRN.
